# Patient Record
Sex: MALE | Race: BLACK OR AFRICAN AMERICAN | Employment: STUDENT | URBAN - METROPOLITAN AREA
[De-identification: names, ages, dates, MRNs, and addresses within clinical notes are randomized per-mention and may not be internally consistent; named-entity substitution may affect disease eponyms.]

---
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
## 2024-10-28 ENCOUNTER — HOSPITAL ENCOUNTER (OUTPATIENT)
Age: 24
Discharge: HOME | End: 2024-10-28
Attending: EMERGENCY MEDICINE
Payer: MEDICAID

## 2024-10-28 VITALS
WEIGHT: 235 LBS | RESPIRATION RATE: 16 BRPM | OXYGEN SATURATION: 100 % | DIASTOLIC BLOOD PRESSURE: 90 MMHG | SYSTOLIC BLOOD PRESSURE: 158 MMHG | HEART RATE: 95 BPM | BODY MASS INDEX: 31.83 KG/M2 | TEMPERATURE: 98 F | HEIGHT: 72 IN

## 2024-10-28 DIAGNOSIS — L02.01 FACIAL ABSCESS: Primary | ICD-10-CM

## 2024-10-28 PROCEDURE — 99203 OFFICE O/P NEW LOW 30 MIN: CPT

## 2024-10-28 PROCEDURE — 10160 PNXR ASPIR ABSC HMTMA BULLA: CPT

## 2024-10-28 PROCEDURE — 99204 OFFICE O/P NEW MOD 45 MIN: CPT

## 2024-10-28 RX ORDER — SULFAMETHOXAZOLE AND TRIMETHOPRIM 800; 160 MG/1; MG/1
1 TABLET ORAL 2 TIMES DAILY
Qty: 14 TABLET | Refills: 0 | Status: SHIPPED | OUTPATIENT
Start: 2024-10-28 | End: 2024-11-04

## 2024-10-28 RX ORDER — CEPHALEXIN 500 MG/1
500 CAPSULE ORAL 3 TIMES DAILY
Qty: 21 CAPSULE | Refills: 0 | Status: SHIPPED | OUTPATIENT
Start: 2024-10-28 | End: 2024-11-04

## 2024-10-28 RX ORDER — NAPROXEN 500 MG/1
500 TABLET ORAL 2 TIMES DAILY PRN
Qty: 20 TABLET | Refills: 0 | Status: SHIPPED | OUTPATIENT
Start: 2024-10-28

## 2024-10-28 NOTE — ED PROVIDER NOTES
Patient Seen in: [facility] [place]      History     Chief Complaint   Patient presents with    Other     Stated Complaint: INGROWN HAIR    Subjective:   HPI      24-year-old male presents to the immediate care with concerns of infected ingrown hair.  Patient is noted over the last few days over his right mandible.  Increasingly painful.  No spontaneous drainage.  He states that he is due for a new razor.  No fevers or chills.  No other acute complaints    Objective:     Past Medical History:    Asthma (HCC)    dx'd age 5 mos              History reviewed. No pertinent surgical history.             No pertinent social history.            Review of Systems   All other systems reviewed and are negative.      Positive for stated complaint: INGROWN HAIR  Other systems are as noted in HPI.  Constitutional and vital signs reviewed.      All other systems reviewed and negative except as noted above.    Physical Exam     ED Triage Vitals [10/28/24 1100]   /90   Pulse 95   Resp 16   Temp 98 °F (36.7 °C)   Temp src Temporal   SpO2 100 %   O2 Device None (Room air)       Current Vitals:   Vital Signs  BP: 158/90  Pulse: 95  Resp: 16  Temp: 98 °F (36.7 °C)  Temp src: Temporal    Oxygen Therapy  SpO2: 100 %  O2 Device: None (Room air)        Physical Exam  Vitals and nursing note reviewed.   Constitutional:       General: He is not in acute distress.     Appearance: Normal appearance. He is well-developed.   HENT:      Head: Normocephalic and atraumatic.      Mouth/Throat:      Comments: No dental issues  Cardiovascular:      Rate and Rhythm: Normal rate and regular rhythm.      Pulses: Normal pulses.      Heart sounds: Normal heart sounds.   Pulmonary:      Effort: Pulmonary effort is normal.      Breath sounds: Normal breath sounds. No stridor.   Musculoskeletal:      Cervical back: Normal range of motion and neck supple.   Lymphadenopathy:      Cervical: No cervical adenopathy.   Skin:     General: Skin is  warm and dry.      Comments: Small fluctuant cystic-like lesion along the mandible   Neurological:      General: No focal deficit present.      Mental Status: He is alert and oriented to person, place, and time.      Cranial Nerves: No cranial nerve deficit.            ED Course   Labs Reviewed - No data to display                MDM      I discussed with the patient that overall this is a cosmetic area there is some interval fluctuation but overall it is indurated.  He was prepped and draped and had local filtration with 1% lidocaine and had an 18-gauge needle placed.  He had minimal purulent material I do not appreciate a distinct foreign body that can be removed.  We discussed that this could be more of a cystic acne versus an ingrown hair but that this time I would do warm compresses and would not do any further type of incision and drainage unless it did not improve there is no clear area purulence.  Patient was discharged in good condition        Medical Decision Making      Disposition and Plan     Clinical Impression:  1. Facial abscess         Disposition:  Discharge  10/28/2024 11:41 am    Follow-up:  [facility]  [address]  [facility] [address]  [phone]  Go to   As needed, If symptoms worsen          Medications Prescribed:  Discharge Medication List as of 10/28/2024 11:44 AM        START taking these medications    Details   cephALEXin 500 MG Oral Cap Take 1 capsule (500 mg total) by mouth 3 (three) times daily for 7 days., Normal, Disp-21 capsule, R-0      sulfamethoxazole-trimethoprim -160 MG Oral Tab per tablet Take 1 tablet by mouth 2 (two) times daily for 7 days., Normal, Disp-14 tablet, R-0      naproxen 500 MG Oral Tab Take 1 tablet (500 mg total) by mouth 2 (two) times daily as needed., Normal, Disp-20 tablet, R-0                 Supplementary Documentation:

## (undated) NOTE — LETTER
[facility] IN [address]  Dept: [phone]  Dept Fax: [phone]      February 27, 2017    Patient: Estefani Neely.    Date of Visit: 2/27/2017       To Whom It May Concern:    Kristin sorto

## (undated) NOTE — LETTER
Date & Time: 8/16/2019, 5:41 PM  Patient: Sharon Head. Encounter Provider(s):    Tish Benson       To Whom It May Concern:    Иван Radha was seen and treated in our department on 8/16/2019.  He should not participate in gym/spor

## (undated) NOTE — Clinical Note
Name:  Kailash Claudio Year:  9th Grade Class: Student ID No.:   Address:  [address] [place] [address] Phone:  [phone] (home)  : [age]year old   Name Relationship [address] Work Phone Home Phone Mobile Phone   1.  Luh Smith 12. Do you get more tired or short of breath more quickly than your friends during exercise? HEART HEALTH QUESTIONS ABOUT YOUR FAMILY Yes No   13.  Has any family member or relative  of heart problems or had an unexpected or unexplained sudden death 31. Have you had infectious mono within the last month?     32. Do you have any rashes, pressure sores, or other skin problems? 35. Have you had a herpes or MRSA skin infection? 29. Have you ever had a head injury or concussion?      35. Have you ev /68 mmHg  Pulse 93  Resp 28  Ht 5' 4.75\" (1.645 m)  Wt 69.854 kg (154 lb)  BMI 25.81 kg/m2 94%ile (Z=1.51) based on CDC 2-20 Years BMI-for-age data using vitals from 3/3/2017.  male    Vision: R 20/    L 20/   Corrected:   [address] [facility] Substance Testing Policy Consent to Random Testing   (This section for high school students only)   8794-2088 school term    As a prerequisite to participation in [employer] activities, we agree that I/our student will not use performance-enhancing

## (undated) NOTE — ED AVS SNAPSHOT
[facility] in [address]    [address] [facility]    Phone:  [phone]    Fax:  [phone]           Akhil Ezekiel Mary Alice Senait.    MRN: [id number]    Department:  THE [facility] Immediate Care in [address] [place] [address],[address]   Date of Visit:  3/7/20 Click [email or web address]      Or call [phone]    If you have any problems with your follow-up, please call our  at [phone].     Si usted tiene algun problema con pacheco sequimiento, por favor llame a nuestro adminstrador de casos al (6 Pharmacy Address Phone Number   [address] [facility] ([address] [facility]) [address] [facility] [address] with [facility] [address] ([address]) [address]  ([address] [facility] [address] [address]) [phone]

## (undated) NOTE — ED AVS SNAPSHOT
[facility] in [address]    [address] [facility]    Phone:  [phone]    Fax:  [phone]           Fatou Chacon.    MRN: [id number]    Department:  THE [facility] OF [facility] Immediate Care in [address] [place] [address],[address] Floor   Date of Visit:  2/27/2 Discharge Instructions       Please return to the Emergency department/clinic if symptoms worsen or you develop new symptoms. Follow up with your primary care physician in 2 days.  Take any medications prescribed to you as instructed and complete any antib self-assessment the day after your visit. You may also receive a call from our patient liason soon after your visit. Also, some patients receive a detailed feedback survey mailed to them a week after the visit.   If you receive this, we would really apprec [facility] [address] [facility] [address] ([address] [facility] [id number] [address] ([address]) [address] [facility]. ([address]) [facility]  Maria Guadalupe Bryant

## (undated) NOTE — LETTER
[facility] CARE IN [facility]  [address] [place] [address] Pkwy  Dept: [phone]  Dept Fax: [phone]         April 13, 2017    Patient: Magdalena Skinner.    YOB: 2000   Date of Visit: 4/13/2017       To Whom It May Con

## (undated) NOTE — ED AVS SNAPSHOT
[facility] in [address] [place] [address] Drive,4Th Floor    [address] [facility]    Phone:  [phone]    Fax:  [phone]           Gerline Eis Gilford Monica.    MRN: [id number]    Department:  [facility] in [facility] END   Date of Visit:  4/13/2 Discharge References/Attachments     FINGER SPRAIN (ENGLISH)      Disclosure     Insurance plans vary and the physician(s) referred by the Immediate Care may not be covered by your plan.  Please contact your insurance company to determine coverage for dank CARE PHYSICIAN AT ONCE OR GO TO THE EMERGENCY DEPARTMENT. If you have been prescribed any medication(s), please fill your prescription right away and begin taking the medication(s) as directed.     If the Immediate Care Provider has read X-rays, these wi coverage. Patient [address] is a Federal Navigator program that can help with your Affordable Care Act coverage, as well as all types of Medicaid plans.   To get signed up and covered, please call [phone] and ask to get set up for an insuran

## (undated) NOTE — MR AVS SNAPSHOT
[address]  [phone]               Thank you for choosing us for your health care visit with Jihan Peck, MD.  We are glad to serve you and happy to provide you with this summa loss. There will be some advice here that will be contrary to everything you have ever believed. If you have questions, please ask me - I love discussing these things. These recommendations are based on the most current science.  Policy makers have not done to mention that they make insulin levels go through the roof - which is the main problem we have to reverse. · Try to eat together at meal time with the TV off. Conversing helps to slow down the speed of eating.  Teach kids to chew their food well - teri The evidence that they are somehow bad for us is completely lacking.  On the contrary, in almost every trial comparing diets, higher fat diets aid weight loss and even improve blood test numbers (the numbers that really count: lowering triglyceride and bad are calorie dense, but offer a variety of different ones. Peanut butter is wonderful, but most brands have a lot of added sugar. There are \"natural\" peanut butters with lower or no sugar - buy these.   · Aim for 4-5 servings of fruits and vegetables daily overly restrictive or punitive parental behavior (\"if you don't eat this, then. ..(punishment)\" can often make a child more resistant. · Nothing to eat after dinner.  It is good for your body to have a period of fasting overnight (from 7 PM - 7 AM for exa Inhale 2 puffs into the lungs 4 (four) times daily as needed for Wheezing.                    Educational Information     Healthy Active Living  An initiative of the American Academy of Pediatrics    Fact Sheet: Healthy Active Living for Families    Healthy Healthy active children are more likely to be healthy active adults!              Visit [facility] online at  [email or web address]

## (undated) NOTE — LETTER
[facility] IMMEDIATE CARE IN / [address] [address]  [address]  Dept: [phone]  Dept Fax: [phone]         April 13, 2017    Patient: Jodi Pereira.    YOB: 2000   Date of Visit: 4/13/2017       To Whom It May Con